# Patient Record
Sex: FEMALE | Race: WHITE | NOT HISPANIC OR LATINO | Employment: UNEMPLOYED | URBAN - METROPOLITAN AREA
[De-identification: names, ages, dates, MRNs, and addresses within clinical notes are randomized per-mention and may not be internally consistent; named-entity substitution may affect disease eponyms.]

---

## 2017-07-19 ENCOUNTER — OFFICE VISIT (OUTPATIENT)
Dept: GASTROENTEROLOGY | Facility: CLINIC | Age: 47
End: 2017-07-19

## 2017-07-19 ENCOUNTER — APPOINTMENT (OUTPATIENT)
Dept: LAB | Facility: HOSPITAL | Age: 47
End: 2017-07-19

## 2017-07-19 VITALS
DIASTOLIC BLOOD PRESSURE: 88 MMHG | TEMPERATURE: 98 F | OXYGEN SATURATION: 98 % | HEART RATE: 90 BPM | HEIGHT: 62 IN | BODY MASS INDEX: 32.65 KG/M2 | WEIGHT: 177.4 LBS | SYSTOLIC BLOOD PRESSURE: 128 MMHG

## 2017-07-19 DIAGNOSIS — K21.9 GASTROESOPHAGEAL REFLUX DISEASE, ESOPHAGITIS PRESENCE NOT SPECIFIED: ICD-10-CM

## 2017-07-19 DIAGNOSIS — R10.12 LUQ ABDOMINAL PAIN: Primary | ICD-10-CM

## 2017-07-19 DIAGNOSIS — K59.04 CHRONIC IDIOPATHIC CONSTIPATION: ICD-10-CM

## 2017-07-19 DIAGNOSIS — R10.32 LLQ ABDOMINAL PAIN: ICD-10-CM

## 2017-07-19 DIAGNOSIS — E66.9 OBESITY, CLASS I, BMI 30-34.9: ICD-10-CM

## 2017-07-19 LAB
ALBUMIN SERPL-MCNC: 4.3 G/DL (ref 3.2–4.8)
ALBUMIN/GLOB SERPL: 1.9 G/DL (ref 1.5–2.5)
ALP SERPL-CCNC: 64 U/L (ref 25–100)
ALT SERPL W P-5'-P-CCNC: 23 U/L (ref 7–40)
ANION GAP SERPL CALCULATED.3IONS-SCNC: 5 MMOL/L (ref 3–11)
AST SERPL-CCNC: 21 U/L (ref 0–33)
BILIRUB SERPL-MCNC: 0.5 MG/DL (ref 0.3–1.2)
BUN BLD-MCNC: 10 MG/DL (ref 9–23)
BUN/CREAT SERPL: 16.7 (ref 7–25)
CALCIUM SPEC-SCNC: 9.3 MG/DL (ref 8.7–10.4)
CHLORIDE SERPL-SCNC: 108 MMOL/L (ref 99–109)
CO2 SERPL-SCNC: 25 MMOL/L (ref 20–31)
CREAT BLD-MCNC: 0.6 MG/DL (ref 0.6–1.3)
GFR SERPL CREATININE-BSD FRML MDRD: 108 ML/MIN/1.73
GLOBULIN UR ELPH-MCNC: 2.3 GM/DL
GLUCOSE BLD-MCNC: 96 MG/DL (ref 70–100)
LIPASE SERPL-CCNC: 34 U/L (ref 6–51)
POTASSIUM BLD-SCNC: 4.1 MMOL/L (ref 3.5–5.5)
PROT SERPL-MCNC: 6.6 G/DL (ref 5.7–8.2)
SODIUM BLD-SCNC: 138 MMOL/L (ref 132–146)

## 2017-07-19 PROCEDURE — 36415 COLL VENOUS BLD VENIPUNCTURE: CPT | Performed by: NURSE PRACTITIONER

## 2017-07-19 PROCEDURE — 80053 COMPREHEN METABOLIC PANEL: CPT | Performed by: NURSE PRACTITIONER

## 2017-07-19 PROCEDURE — 83013 H PYLORI (C-13) BREATH: CPT | Performed by: NURSE PRACTITIONER

## 2017-07-19 PROCEDURE — 83690 ASSAY OF LIPASE: CPT | Performed by: NURSE PRACTITIONER

## 2017-07-19 PROCEDURE — 99204 OFFICE O/P NEW MOD 45 MIN: CPT | Performed by: NURSE PRACTITIONER

## 2017-07-19 RX ORDER — NAPROXEN SODIUM 220 MG
220 TABLET ORAL 2 TIMES DAILY PRN
COMMUNITY

## 2017-07-19 RX ORDER — ESOMEPRAZOLE MAGNESIUM 40 MG/1
40 CAPSULE, DELAYED RELEASE ORAL DAILY
Qty: 30 CAPSULE | Refills: 2 | Status: SHIPPED | OUTPATIENT
Start: 2017-07-19 | End: 2017-07-20

## 2017-07-19 NOTE — PROGRESS NOTES
OUTPATIENT NEW PATIENT NOTE  Patient: JULIETA GRAVES : 1970  Date of Service: 2017  Dear Dr.Jeffrey BE Tobin MD   Thank you for your referral of this patient for evaluation of abd discomfort  CC: GI Problem (Abdominal discomfort, loose stools)  Assessment/Plan                                             ASSESSMENT & PLANS     Chronic, intermittent LUQ abdominal pain r/t musculoskeletal (onset since moving furniture in 2017), stomach and/or duodenal ulcers, pancreatitis, and/or biliary colic.  Gastroesophageal reflux disease, esophagitis presence not specified  Obesity, Class I, BMI 30-34.9  -     Comprehensive Metabolic Panel  -     Lipase  -     H. Pylori Breath Test  -     Start esomeprazole (nexIUM) 40 MG capsule; Take 1 capsule by mouth Daily for 1 day. Take first thing in the morning on an empty stomach.  Wait at least 30 min to 1hr before eating.  · Anti-reflux measures.   · Will consider US and/or EGD is sx persist despite medical mgt and/or negative dx testing    Chronic idiopathic constipation  LLQ abdominal pain r/t diverticulitis, inflammed descending/transverse/rectosigmoid colon, pelvic pain, groin pain, and/or inguinal hernia.  · Take OTC stool softeners 1-2 days.  · High fiber diet    Follow Up: Return in about 2 months (around 2017) for Recheck.      DISCUSSION: The above plan was delineated in details with patient and  and all questions and concerns were answered.  Patient is also given contact information.  Patient is to return as scheduled or sooner if new problems arise  Subjective                                                     SUBJECTIVE   History of Present Illness  Ms. Julieta Graves is a 46 y.o. female presents to the clinic today for an evaluation of GI Problem (Abdominal discomfort, loose stools)  not pain, but it is more as a discomfort  LUQ > LLQ abd discomfort, which is improved after having a BM  Discomfort since mid  after trip to [place] (in  May).  At that time, pt was moving furniture d/t having property in [place].  Pain is almost constant. It does not wake pt up at night.  Discomfort feeling like a dull/pulling motion. Sitting still makes it worse.  Moving around makes it better. Eating is not a trigger. Not tender to touch.      For many yrs, constipation and diarrhea. Still able to have normal and formed stools as well. Pt denies dark black stools or bright red blood in the stools, in the toilet bowl, or on the toilet tissue.   When pt has diarrhea, she has loose stools, not so much watery stools.  Usually about 1 or 2 BMs  When pt has constipation, no BM for 3-4 days. Takes laxative OTC PRN w/ relief. Pt reports of occasionallyhaving to strain and tenesmus.  Once w/ having hard/lumpy stool.    OTC Aleve PRN seldom.  Wt is relative.  No prior work up or tx for abd pain.      ROS:Review of Systems   Constitutional: Positive for fatigue. Negative for activity change, appetite change, fever and unexpected weight change.   HENT: Negative for hearing loss, trouble swallowing and voice change.    Eyes: Negative for visual disturbance.   Respiratory: Positive for cough and shortness of breath. Negative for choking and chest tightness.    Cardiovascular: Negative for chest pain.   Gastrointestinal: Positive for abdominal distention (bloating), abdominal pain (discomfort), constipation, diarrhea and nausea. Negative for anal bleeding, blood in stool, rectal pain and vomiting.   Endocrine: Negative for polydipsia and polyphagia.   Genitourinary: Negative.    Musculoskeletal: Positive for joint swelling (knees). Negative for gait problem.   Skin: Negative for color change and rash.   Allergic/Immunologic: Negative for food allergies.   Neurological: Positive for dizziness and numbness (right arm). Negative for seizures and speech difficulty.   Hematological: Negative for adenopathy.   Psychiatric/Behavioral: Positive for sleep disturbance. Negative for  confusion.     PAST MED HX: Pt  has no past medical history on file.  PAST SURG HX: Pt  has no past surgical history on file.  FAM HX: family history includes Colon cancer great aunt (maternal)  SOC HX: Pt  reports that she has quit smoking. Her smoking use included Cigarettes. She does not have any smokeless tobacco history on file. She reports that she drinks alcohol.  Objective                                                           OBJECTIVE   Allergy: Pt is allergic to penicillins.  MEDS: •  naproxen sodium (ALEVE) 220 MG tablet, Take 220 mg by mouth 2 (Two) Times a Day As Needed for Mild Pain ., Disp: , Rfl:     Wt Readings from Last 5 Encounters:   07/19/17 177 lb 6.4 oz (80.5 kg)   body mass index is 32.45 kg/(m^2).,Temp: 98 °F (36.7 °C),BP: 128/88,Heart Rate: 90   Physical Exam  General Well developed; well nourished; no acute distress.   ENT Oral mucosa pink and moist without thrush or lesions.    Neck Neck supple; trachea midline. No thyromegaly   Resp CTA; no rhonchi, rales, or wheezes.  Respiration effort normal  CV RRR; normal S1, S2; no M/R/G. No lower extremity edema  GI Abd soft, NT, ND, normal active bowel sounds.  No HSM.  No abd hernia  Skin No rash; no lesions; no bruises.  Skin turgor normal  Musc No clubbing; no cyanosis.  Gait steady  Psych Oriented to time, place, and person.  Appropriate affect  Thank you kindly for allowing me to be part of this patient’s care.    Pt care team: Kylah ELLIOTT & [facility]--Gastroenterology  [phone]    CC: Dr.Jeffrey BE Tobin MD  [address] [place] [address] / [place] [address] FAX:[phone]

## 2017-07-19 NOTE — PATIENT INSTRUCTIONS
Take OTC stool softeners 1-2 days.  Docusate capsules  What is this medicine?  DOCUSATE (doc CUE sayt) is stool softener. It helps prevent constipation and straining or discomfort associated with hard or dry stools.  This medicine may be used for other purposes; ask your health care provider or pharmacist if you have questions.  COMMON BRAND NAME(S): Colace, Colace Clear, Correctol, D.O.S., DC, Doc-Q-Lace, DocuLace, Docusoft S, DOK, DOK Extra Strength, Dulcolax, Genasoft, Ernesto-Tin, Kaopectate Liqui-Gels, Akers Stool Softener, Stool Softener, Stool Softner DC, Sulfolax, Татьяна-Q-Lax, Surfak, Uni-Ease  What should I tell my health care provider before I take this medicine?  They need to know if you have any of these conditions:  -nausea or vomiting  -severe constipation  -stomach pain  -sudden change in bowel habit lasting more than 2 weeks  -an unusual or allergic reaction to docusate, other medicines, foods, dyes, or preservatives  -pregnant or trying to get pregnant  -breast-feeding  How should I use this medicine?  Take this medicine by mouth with a glass of water. Follow the directions on the label. Take your doses at regular intervals. Do not take your medicine more often than directed.  Talk to your pediatrician regarding the use of this medicine in children. While this medicine may be prescribed for children as young as 2 years for selected conditions, precautions do apply.  Overdosage: If you think you have taken too much of this medicine contact a poison control center or emergency room at once.  NOTE: This medicine is only for you. Do not share this medicine with others.  What if I miss a dose?  If you miss a dose, take it as soon as you can. If it is almost time for your next dose, take only that dose. Do not take double or extra doses.  What may interact with this medicine?  -mineral oil  This list may not describe all possible interactions. Give your health care provider a list of all the medicines,  herbs, non-prescription drugs, or dietary supplements you use. Also tell them if you smoke, drink alcohol, or use illegal drugs. Some items may interact with your medicine.  What should I watch for while using this medicine?  Do not use for more than one week without advice from your doctor or health care professional. If your constipation returns, check with your doctor or health care professional.  Drink plenty of water while taking this medicine. Drinking water helps decrease constipation.  Stop using this medicine and contact your doctor or health care professional if you experience any rectal bleeding or do not have a bowel movement after use. These could be signs of a more serious condition.  What side effects may I notice from receiving this medicine?  Side effects that you should report to your doctor or health care professional as soon as possible:  -allergic reactions like skin rash, itching or hives, swelling of the face, lips, or tongue  Side effects that usually do not require medical attention (report to your doctor or health care professional if they continue or are bothersome):  -diarrhea  -stomach cramps  -throat irritation  This list may not describe all possible side effects. Call your doctor for medical advice about side effects. You may report side effects to FDA at 6-563-FDA-8089.  Where should I keep my medicine?  Keep out of the reach of children.  Store at room temperature between 15 and 30 degrees C (59 and 86 degrees F). Throw away any unused medicine after the expiration date.  NOTE: This sheet is a summary. It may not cover all possible information. If you have questions about this medicine, talk to your doctor, pharmacist, or health care provider.     © 2017, [employer]. (2009-04-09 15:56:49)

## 2017-07-21 LAB — UREA BREATH TEST QL: NEGATIVE

## 2017-07-24 ENCOUNTER — TELEPHONE (OUTPATIENT)
Dept: GASTROENTEROLOGY | Facility: CLINIC | Age: 47
End: 2017-07-24

## 2017-07-24 NOTE — TELEPHONE ENCOUNTER
----- Message from LEXI Castellon sent at 7/21/2017  7:35 PM EDT -----  Pls let pt know that breath test is negative for H-pylori infection.